# Patient Record
Sex: MALE | Race: WHITE
[De-identification: names, ages, dates, MRNs, and addresses within clinical notes are randomized per-mention and may not be internally consistent; named-entity substitution may affect disease eponyms.]

---

## 2020-06-04 ENCOUNTER — HOSPITAL ENCOUNTER (OUTPATIENT)
Dept: HOSPITAL 92 - SCSCT | Age: 65
Discharge: HOME | End: 2020-06-04
Attending: SPECIALIST
Payer: COMMERCIAL

## 2020-06-04 DIAGNOSIS — R16.1: Primary | ICD-10-CM

## 2020-06-04 PROCEDURE — 74177 CT ABD & PELVIS W/CONTRAST: CPT

## 2020-06-04 NOTE — CT
CT ABDOMEN AND PELVIS WITH IV CONTRAST

 6/4/2020



CLINICAL INFORMATION:

Severe abdominal pain in November 2019. Splenomegaly.



COMPARISON:

 None.



Technique:

Multiple contiguous axial CT images are obtained through the abdomen and pelvis with IV contrast. Cor
onal reformatted images are provided.



FINDINGS:



Lower Chest: Lung bases are clear.

Vessels: Abdominal aorta is normal in caliber. Vascular calcifications are seen in the splenic artery
.



Abdomen:

Portal vein:Patent

Gallbladder: Surgically absent.

Liver: within normal limits.



Spleen: There is an ill-defined subtle low-attenuation mass seen involving the superior pole and body
 of the spleen with greatest measurement in in axial dimensions of 8.5 cm x 6.7 cm. The margins of

this mass are not well delineated. A few centimeter lower attenuation areas are seen within this subt
le mass. No significant enhancement is appreciated. Calcification is seen along the superior margin

of the spleen, and the mass extends to to the superior margin of the spleen.



Pancreas: within normal limits.

Adrenals: within normal limits.

Kidneys: within normal limits.



Bowel: Colonic diverticulosis. Loops of small bowel are normal in caliber.

Appendix: The appendix is visualized and normal in caliber.





Peritoneum: No ascites or free air; no fluid collection.

Mesentery and Retroperitoneum: No enlarged mesenteric or retroperitoneal lymph nodes.

Abdominal Wall: within normal limits.



Pelvis:

Reproductive Organs: No pelvic masses.

Pelvis within normal limits.

Bladder: within normal limits.



Bones: Multilevel degenerative changes are seen throughout the visualized thoracic spine as well as t
he lumbar spine. There is irregularity of the endplates at the L2-3 level with subchondral cystic

appearing changes and vacuum phenomenon. These findings are thought to most likely be related to lucho
re endplate degenerative changes as opposed to an infectious process.  



IMPRESSION:

Subtle hypodense mass in the superior pole and body of the spleen. This could potentially represent a
 hamartoma. However, other etiologies including neoplastic process cannot be entirely excluded. MRI

of the abdomen with and without IV contrast versus 3 phase CT abdomen is recommended for further eval
uation and characterization of this mass.



Reported By: Inderjit Vasquez 

Electronically Signed:  6/4/2020 2:31 PM

## 2020-07-07 ENCOUNTER — HOSPITAL ENCOUNTER (OUTPATIENT)
Dept: HOSPITAL 92 - TBSIIMAG | Age: 65
Discharge: HOME | End: 2020-07-07
Attending: SPECIALIST
Payer: COMMERCIAL

## 2020-07-07 DIAGNOSIS — R16.1: Primary | ICD-10-CM

## 2020-07-07 PROCEDURE — 74183 MRI ABD W/O CNTR FLWD CNTR: CPT

## 2020-07-07 NOTE — MRI
MRI ABDOMEN WITH AND WITHOUT IV CONTRAST:

 

Date:  07/07/2020

 

HISTORY:  

Splenic mass and splenomegaly. 

 

FINDINGS:

 

Correlation is made with the CT scan of 06/04/2020. 

 

The spleen is mildly enlarged, measuring 14.0 cm in length. There is a complex mass in the anterior a
spect of the spleen measuring 8.6 x 7.8 x 6.8 cm. This is predominantly solid with a few small cystic
 areas. 

 

The liver, pancreas, adrenal glands, and right kidney are normal. There is a 17.0 mm cyst in the supe
rior pole of the left kidney. 

 

No free fluid or lymphadenopathy is seen. The aorta is of normal caliber. There are degenerative hollingsworth
ges in the spine. Bone marrow signal is normal.

 

IMPRESSION: 

1.  Mild splenomegaly. 

2.  Indeterminate splenic mass. Differential diagnosis includes hamartoma, lymphangioma, and littoral
 cell angioma. Odds of asymptomatic angiosarcoma are exceedingly small. 

 

RECOMMENDATION:

A follow-up exam is recommended in 6 months to assess for growth. 

 

 

 

POS: [facility]

## 2021-03-04 ENCOUNTER — HOSPITAL ENCOUNTER (INPATIENT)
Dept: HOSPITAL 92 - SURG A | Age: 66
LOS: 42 days | Discharge: HOME | DRG: 828 | End: 2021-04-15
Attending: SPECIALIST | Admitting: SPECIALIST
Payer: COMMERCIAL

## 2021-03-04 ENCOUNTER — HOSPITAL ENCOUNTER (OUTPATIENT)
Dept: HOSPITAL 92 - LABBT | Age: 66
Discharge: HOME | End: 2021-03-04
Attending: SPECIALIST
Payer: COMMERCIAL

## 2021-03-04 VITALS — BODY MASS INDEX: 36.2 KG/M2

## 2021-03-04 DIAGNOSIS — Z90.49: ICD-10-CM

## 2021-03-04 DIAGNOSIS — I10: ICD-10-CM

## 2021-03-04 DIAGNOSIS — Z20.822: ICD-10-CM

## 2021-03-04 DIAGNOSIS — Z01.812: ICD-10-CM

## 2021-03-04 DIAGNOSIS — Q85.9: Primary | ICD-10-CM

## 2021-03-04 DIAGNOSIS — R16.1: ICD-10-CM

## 2021-03-04 DIAGNOSIS — Z01.818: Primary | ICD-10-CM

## 2021-03-04 DIAGNOSIS — F41.9: ICD-10-CM

## 2021-03-04 DIAGNOSIS — Z90.89: ICD-10-CM

## 2021-03-04 LAB
ANION GAP SERPL CALC-SCNC: 13 MMOL/L (ref 10–20)
BASOPHILS # BLD AUTO: 0.1 10X3/UL (ref 0–0.2)
BASOPHILS NFR BLD AUTO: 1.3 % (ref 0–2)
BUN SERPL-MCNC: 24 MG/DL (ref 8.4–25.7)
CALCIUM SERPL-MCNC: 9.2 MG/DL (ref 7.8–10.44)
CHLORIDE SERPL-SCNC: 106 MMOL/L (ref 98–107)
CO2 SERPL-SCNC: 27 MMOL/L (ref 23–31)
CREAT CL PREDICTED SERPL C-G-VRATE: 0 ML/MIN (ref 70–130)
EOSINOPHIL # BLD AUTO: 0.2 10X3/UL (ref 0–0.5)
EOSINOPHIL NFR BLD AUTO: 3.2 % (ref 0–6)
GLUCOSE SERPL-MCNC: 109 MG/DL (ref 80–115)
HGB BLD-MCNC: 13.4 G/DL (ref 13.5–17.5)
LYMPHOCYTES NFR BLD AUTO: 21.6 % (ref 18–47)
MCH RBC QN AUTO: 27.9 PG (ref 27–33)
MCV RBC AUTO: 86 FL (ref 81.2–95.1)
MONOCYTES # BLD AUTO: 0.6 10X3/UL (ref 0–1.1)
MONOCYTES NFR BLD AUTO: 7.5 % (ref 0–10)
NEUTROPHILS # BLD AUTO: 5 10X3/UL (ref 1.5–8.4)
NEUTROPHILS NFR BLD AUTO: 66 % (ref 40–75)
PLATELET # BLD AUTO: 310 10X3/UL (ref 150–450)
POTASSIUM SERPL-SCNC: 4.8 MMOL/L (ref 3.5–5.1)
RBC # BLD AUTO: 4.8 10X6/UL (ref 4.32–5.72)
SODIUM SERPL-SCNC: 141 MMOL/L (ref 136–145)
WBC # BLD AUTO: 7.6 10X3/UL (ref 3.5–10.5)

## 2021-03-04 PROCEDURE — 86850 RBC ANTIBODY SCREEN: CPT

## 2021-03-04 PROCEDURE — 80048 BASIC METABOLIC PNL TOTAL CA: CPT

## 2021-03-04 PROCEDURE — 86901 BLOOD TYPING SEROLOGIC RH(D): CPT

## 2021-03-04 PROCEDURE — 86900 BLOOD TYPING SEROLOGIC ABO: CPT

## 2021-03-04 PROCEDURE — 88305 TISSUE EXAM BY PATHOLOGIST: CPT

## 2021-03-04 PROCEDURE — 87635 SARS-COV-2 COVID-19 AMP PRB: CPT

## 2021-03-04 PROCEDURE — S0020 INJECTION, BUPIVICAINE HYDRO: HCPCS

## 2021-03-04 PROCEDURE — 85025 COMPLETE CBC W/AUTO DIFF WBC: CPT

## 2021-03-04 PROCEDURE — U0005 INFEC AGEN DETEC AMPLI PROBE: HCPCS

## 2021-03-04 PROCEDURE — U0003 INFECTIOUS AGENT DETECTION BY NUCLEIC ACID (DNA OR RNA); SEVERE ACUTE RESPIRATORY SYNDROME CORONAVIRUS 2 (SARS-COV-2) (CORONAVIRUS DISEASE [COVID-19]), AMPLIFIED PROBE TECHNIQUE, MAKING USE OF HIGH THROUGHPUT TECHNOLOGIES AS DESCRIBED BY CMS-2020-01-R: HCPCS

## 2021-03-04 PROCEDURE — 88313 SPECIAL STAINS GROUP 2: CPT

## 2021-03-04 PROCEDURE — 93010 ELECTROCARDIOGRAM REPORT: CPT

## 2021-03-04 PROCEDURE — 36415 COLL VENOUS BLD VENIPUNCTURE: CPT

## 2021-03-04 PROCEDURE — 82150 ASSAY OF AMYLASE: CPT

## 2021-03-04 PROCEDURE — S0028 INJECTION, FAMOTIDINE, 20 MG: HCPCS

## 2021-03-04 PROCEDURE — 93005 ELECTROCARDIOGRAM TRACING: CPT

## 2021-03-19 ENCOUNTER — HOSPITAL ENCOUNTER (OUTPATIENT)
Dept: HOSPITAL 92 - LABBT | Age: 66
Discharge: HOME | End: 2021-03-19
Attending: SPECIALIST
Payer: COMMERCIAL

## 2021-03-19 DIAGNOSIS — Z01.818: Primary | ICD-10-CM

## 2021-03-19 DIAGNOSIS — Z20.822: ICD-10-CM

## 2021-03-19 DIAGNOSIS — R94.39: ICD-10-CM

## 2021-03-19 PROCEDURE — U0003 INFECTIOUS AGENT DETECTION BY NUCLEIC ACID (DNA OR RNA); SEVERE ACUTE RESPIRATORY SYNDROME CORONAVIRUS 2 (SARS-COV-2) (CORONAVIRUS DISEASE [COVID-19]), AMPLIFIED PROBE TECHNIQUE, MAKING USE OF HIGH THROUGHPUT TECHNOLOGIES AS DESCRIBED BY CMS-2020-01-R: HCPCS

## 2021-03-19 PROCEDURE — U0005 INFEC AGEN DETEC AMPLI PROBE: HCPCS

## 2021-03-19 PROCEDURE — 87635 SARS-COV-2 COVID-19 AMP PRB: CPT

## 2021-03-23 ENCOUNTER — HOSPITAL ENCOUNTER (OUTPATIENT)
Dept: HOSPITAL 92 - CCL | Age: 66
Discharge: HOME | End: 2021-03-23
Attending: INTERNAL MEDICINE
Payer: COMMERCIAL

## 2021-03-23 VITALS — BODY MASS INDEX: 36.2 KG/M2

## 2021-03-23 DIAGNOSIS — I10: ICD-10-CM

## 2021-03-23 DIAGNOSIS — I49.3: ICD-10-CM

## 2021-03-23 DIAGNOSIS — I25.10: Primary | ICD-10-CM

## 2021-03-23 DIAGNOSIS — Z79.899: ICD-10-CM

## 2021-03-23 PROCEDURE — 99152 MOD SED SAME PHYS/QHP 5/>YRS: CPT

## 2021-03-23 PROCEDURE — 99153 MOD SED SAME PHYS/QHP EA: CPT

## 2021-03-23 PROCEDURE — 4A023N7 MEASUREMENT OF CARDIAC SAMPLING AND PRESSURE, LEFT HEART, PERCUTANEOUS APPROACH: ICD-10-PCS | Performed by: INTERNAL MEDICINE

## 2021-03-23 PROCEDURE — 93458 L HRT ARTERY/VENTRICLE ANGIO: CPT

## 2021-03-23 PROCEDURE — B2111ZZ FLUOROSCOPY OF MULTIPLE CORONARY ARTERIES USING LOW OSMOLAR CONTRAST: ICD-10-PCS | Performed by: INTERNAL MEDICINE

## 2021-03-23 PROCEDURE — 76942 ECHO GUIDE FOR BIOPSY: CPT

## 2021-04-08 ENCOUNTER — HOSPITAL ENCOUNTER (OUTPATIENT)
Dept: HOSPITAL 92 - LABBT | Age: 66
Discharge: HOME | End: 2021-04-08
Attending: SPECIALIST
Payer: COMMERCIAL

## 2021-04-08 DIAGNOSIS — D73.89: ICD-10-CM

## 2021-04-08 DIAGNOSIS — Z01.818: Primary | ICD-10-CM

## 2021-04-08 DIAGNOSIS — Z20.822: ICD-10-CM

## 2021-04-08 LAB
ANION GAP SERPL CALC-SCNC: 16 MMOL/L (ref 10–20)
BASOPHILS # BLD AUTO: 0.1 10X3/UL (ref 0–0.2)
BASOPHILS NFR BLD AUTO: 1 % (ref 0–2)
BUN SERPL-MCNC: 29 MG/DL (ref 8.4–25.7)
CALCIUM SERPL-MCNC: 9.3 MG/DL (ref 7.8–10.44)
CHLORIDE SERPL-SCNC: 106 MMOL/L (ref 98–107)
CO2 SERPL-SCNC: 22 MMOL/L (ref 23–31)
CREAT CL PREDICTED SERPL C-G-VRATE: 0 ML/MIN (ref 70–130)
EOSINOPHIL # BLD AUTO: 0.3 10X3/UL (ref 0–0.5)
EOSINOPHIL NFR BLD AUTO: 3.5 % (ref 0–6)
GLUCOSE SERPL-MCNC: 76 MG/DL (ref 80–115)
HGB BLD-MCNC: 12.8 G/DL (ref 13.5–17.5)
LYMPHOCYTES NFR BLD AUTO: 24.4 % (ref 18–47)
MCH RBC QN AUTO: 27.2 PG (ref 27–33)
MCV RBC AUTO: 86.2 FL (ref 81.2–95.1)
MONOCYTES # BLD AUTO: 0.7 10X3/UL (ref 0–1.1)
MONOCYTES NFR BLD AUTO: 8.6 % (ref 0–10)
NEUTROPHILS # BLD AUTO: 5 10X3/UL (ref 1.5–8.4)
NEUTROPHILS NFR BLD AUTO: 62.1 % (ref 40–75)
PLATELET # BLD AUTO: 302 10X3/UL (ref 150–450)
POTASSIUM SERPL-SCNC: 5 MMOL/L (ref 3.5–5.1)
RBC # BLD AUTO: 4.7 10X6/UL (ref 4.32–5.72)
SODIUM SERPL-SCNC: 139 MMOL/L (ref 136–145)
WBC # BLD AUTO: 8 10X3/UL (ref 3.5–10.5)

## 2021-04-08 PROCEDURE — 93005 ELECTROCARDIOGRAM TRACING: CPT

## 2021-04-08 PROCEDURE — U0005 INFEC AGEN DETEC AMPLI PROBE: HCPCS

## 2021-04-08 PROCEDURE — U0003 INFECTIOUS AGENT DETECTION BY NUCLEIC ACID (DNA OR RNA); SEVERE ACUTE RESPIRATORY SYNDROME CORONAVIRUS 2 (SARS-COV-2) (CORONAVIRUS DISEASE [COVID-19]), AMPLIFIED PROBE TECHNIQUE, MAKING USE OF HIGH THROUGHPUT TECHNOLOGIES AS DESCRIBED BY CMS-2020-01-R: HCPCS

## 2021-04-08 PROCEDURE — 80048 BASIC METABOLIC PNL TOTAL CA: CPT

## 2021-04-08 PROCEDURE — 85025 COMPLETE CBC W/AUTO DIFF WBC: CPT

## 2021-04-08 PROCEDURE — 93010 ELECTROCARDIOGRAM REPORT: CPT

## 2021-04-08 PROCEDURE — 87635 SARS-COV-2 COVID-19 AMP PRB: CPT

## 2021-04-13 LAB
HGB BLD-MCNC: 13.7 G/DL (ref 14–18)
MCH RBC QN AUTO: 28.3 PG (ref 27–31)
MCV RBC AUTO: 87.2 FL (ref 78–98)
MDIFF COMPLETE?: YES
PLATELET # BLD AUTO: 321 THOU/UL (ref 130–400)
POLYCHROMASIA BLD QL SMEAR: (no result) (100X)
RBC # BLD AUTO: 4.85 MILL/UL (ref 4.7–6.1)
WBC # BLD AUTO: 20 THOU/UL (ref 4.8–10.8)

## 2021-04-13 PROCEDURE — 07BP4ZZ EXCISION OF SPLEEN, PERCUTANEOUS ENDOSCOPIC APPROACH: ICD-10-PCS | Performed by: SPECIALIST

## 2021-04-13 RX ADMIN — FAMOTIDINE SCH: 10 INJECTION, SOLUTION INTRAVENOUS at 20:59

## 2021-04-13 RX ADMIN — HYDROCODONE BITARTRATE AND ACETAMINOPHEN PRN TAB: 10; 325 TABLET ORAL at 20:57

## 2021-04-13 RX ADMIN — POTASSIUM CHLORIDE, DEXTROSE MONOHYDRATE AND SODIUM CHLORIDE SCH MLS: 150; 5; 450 INJECTION, SOLUTION INTRAVENOUS at 20:58

## 2021-04-14 LAB
AMYLASE SERPL-CCNC: 36 U/L (ref 25–125)
ANION GAP SERPL CALC-SCNC: 13 MMOL/L (ref 10–20)
BASOPHILS # BLD AUTO: 0 THOU/UL (ref 0–0.2)
BASOPHILS NFR BLD AUTO: 0.2 % (ref 0–1)
BUN SERPL-MCNC: 17 MG/DL (ref 8.4–25.7)
CALCIUM SERPL-MCNC: 8.7 MG/DL (ref 7.8–10.44)
CHLORIDE SERPL-SCNC: 107 MMOL/L (ref 98–107)
CO2 SERPL-SCNC: 21 MMOL/L (ref 23–31)
CREAT CL PREDICTED SERPL C-G-VRATE: 109 ML/MIN (ref 70–130)
EOSINOPHIL # BLD AUTO: 0 THOU/UL (ref 0–0.7)
EOSINOPHIL NFR BLD AUTO: 0 % (ref 0–10)
GLUCOSE SERPL-MCNC: 144 MG/DL (ref 80–115)
HGB BLD-MCNC: 13 G/DL (ref 14–18)
LYMPHOCYTES # BLD: 0.8 THOU/UL (ref 1.2–3.4)
LYMPHOCYTES NFR BLD AUTO: 5.7 % (ref 21–51)
MCH RBC QN AUTO: 27.9 PG (ref 27–31)
MCV RBC AUTO: 86.3 FL (ref 78–98)
MONOCYTES # BLD AUTO: 1.1 THOU/UL (ref 0.11–0.59)
MONOCYTES NFR BLD AUTO: 7.7 % (ref 0–10)
NEUTROPHILS # BLD AUTO: 12.8 THOU/UL (ref 1.4–6.5)
NEUTROPHILS NFR BLD AUTO: 86.4 % (ref 42–75)
PLATELET # BLD AUTO: 325 THOU/UL (ref 130–400)
POTASSIUM SERPL-SCNC: 4.8 MMOL/L (ref 3.5–5.1)
RBC # BLD AUTO: 4.65 MILL/UL (ref 4.7–6.1)
SODIUM SERPL-SCNC: 136 MMOL/L (ref 136–145)
WBC # BLD AUTO: 14.8 THOU/UL (ref 4.8–10.8)

## 2021-04-14 RX ADMIN — HYDROCODONE BITARTRATE AND ACETAMINOPHEN PRN TAB: 10; 325 TABLET ORAL at 18:18

## 2021-04-14 RX ADMIN — HYDROCODONE BITARTRATE AND ACETAMINOPHEN PRN TAB: 10; 325 TABLET ORAL at 14:18

## 2021-04-14 RX ADMIN — HYDROCODONE BITARTRATE AND ACETAMINOPHEN PRN TAB: 10; 325 TABLET ORAL at 02:28

## 2021-04-14 RX ADMIN — HYDROCODONE BITARTRATE AND ACETAMINOPHEN PRN TAB: 10; 325 TABLET ORAL at 22:15

## 2021-04-14 RX ADMIN — HYDROCODONE BITARTRATE AND ACETAMINOPHEN PRN TAB: 10; 325 TABLET ORAL at 10:14

## 2021-04-14 RX ADMIN — POTASSIUM CHLORIDE, DEXTROSE MONOHYDRATE AND SODIUM CHLORIDE SCH MLS: 150; 5; 450 INJECTION, SOLUTION INTRAVENOUS at 05:30

## 2021-04-14 RX ADMIN — POTASSIUM CHLORIDE, DEXTROSE MONOHYDRATE AND SODIUM CHLORIDE SCH MLS: 150; 5; 450 INJECTION, SOLUTION INTRAVENOUS at 17:00

## 2021-04-14 RX ADMIN — FAMOTIDINE SCH: 10 INJECTION, SOLUTION INTRAVENOUS at 10:27

## 2021-04-15 VITALS — TEMPERATURE: 97.4 F | DIASTOLIC BLOOD PRESSURE: 90 MMHG | SYSTOLIC BLOOD PRESSURE: 152 MMHG

## 2021-04-15 LAB
BASOPHILS # BLD AUTO: 0.1 THOU/UL (ref 0–0.2)
BASOPHILS NFR BLD AUTO: 0.9 % (ref 0–1)
EOSINOPHIL # BLD AUTO: 0.4 THOU/UL (ref 0–0.7)
EOSINOPHIL NFR BLD AUTO: 3 % (ref 0–10)
HGB BLD-MCNC: 12 G/DL (ref 14–18)
LYMPHOCYTES # BLD: 2.5 THOU/UL (ref 1.2–3.4)
LYMPHOCYTES NFR BLD AUTO: 20.5 % (ref 21–51)
MCH RBC QN AUTO: 27.6 PG (ref 27–31)
MCV RBC AUTO: 88 FL (ref 78–98)
MONOCYTES # BLD AUTO: 1.7 THOU/UL (ref 0.11–0.59)
MONOCYTES NFR BLD AUTO: 13.6 % (ref 0–10)
NEUTROPHILS # BLD AUTO: 7.7 THOU/UL (ref 1.4–6.5)
NEUTROPHILS NFR BLD AUTO: 62 % (ref 42–75)
PLATELET # BLD AUTO: 339 THOU/UL (ref 130–400)
RBC # BLD AUTO: 4.34 MILL/UL (ref 4.7–6.1)
WBC # BLD AUTO: 12.3 THOU/UL (ref 4.8–10.8)

## 2021-04-15 RX ADMIN — HYDROCODONE BITARTRATE AND ACETAMINOPHEN PRN TAB: 10; 325 TABLET ORAL at 07:26

## 2021-04-15 RX ADMIN — HYDROCODONE BITARTRATE AND ACETAMINOPHEN PRN TAB: 10; 325 TABLET ORAL at 03:08

## 2021-04-15 RX ADMIN — HYDROCODONE BITARTRATE AND ACETAMINOPHEN PRN TAB: 10; 325 TABLET ORAL at 11:17

## 2023-07-17 ENCOUNTER — HOSPITAL ENCOUNTER (OUTPATIENT)
Dept: HOSPITAL 92 - CSHMRI | Age: 68
Discharge: HOME | End: 2023-07-17
Attending: UROLOGY
Payer: COMMERCIAL

## 2023-07-17 DIAGNOSIS — C61: Primary | ICD-10-CM

## 2023-07-17 LAB — EGFRCR SERPLBLD CKD-EPI 2021: 55 ML/MIN/{1.73_M2}

## 2023-07-17 PROCEDURE — 82565 ASSAY OF CREATININE: CPT

## 2023-07-17 PROCEDURE — 72197 MRI PELVIS W/O & W/DYE: CPT
